# Patient Record
Sex: MALE | Race: WHITE
[De-identification: names, ages, dates, MRNs, and addresses within clinical notes are randomized per-mention and may not be internally consistent; named-entity substitution may affect disease eponyms.]

---

## 2018-04-04 ENCOUNTER — HOSPITAL ENCOUNTER (OUTPATIENT)
Dept: HOSPITAL 45 - C.LABBFT | Age: 23
Discharge: HOME | End: 2018-04-04
Attending: INTERNAL MEDICINE
Payer: COMMERCIAL

## 2018-04-04 DIAGNOSIS — E66.01: ICD-10-CM

## 2018-04-04 DIAGNOSIS — E03.9: Primary | ICD-10-CM

## 2018-04-05 LAB — HBA1C MFR BLD: 5.3 % (ref 4.5–5.6)

## 2018-04-25 ENCOUNTER — HOSPITAL ENCOUNTER (OUTPATIENT)
Dept: HOSPITAL 45 - C.ULTR | Age: 23
Discharge: HOME | End: 2018-04-25
Attending: INTERNAL MEDICINE
Payer: COMMERCIAL

## 2018-04-25 DIAGNOSIS — K31.84: ICD-10-CM

## 2018-04-25 DIAGNOSIS — R94.6: Primary | ICD-10-CM

## 2018-04-25 NOTE — DIAGNOSTIC IMAGING REPORT
GASTRIC EMPTYING



HISTORY: Upper abdominal pain GASTROPARESIS



COMPARISON: None.



TECHNIQUE: Following the oral administration of 1 mCi of technetium 99m sulfur

colloid in egg sandwich and 8 ounces of water, static abdominal images are

obtained anteriorly and posteriorly at 0 minutes, 1 hour, 2 hour, and 4 hour

time intervals. Gastric emptying was calculated utilizing the geometric mean

method.



FINDINGS: There is approximately 89 % activity remaining at the 1 hour time

interval (normal is less than 90%), 48 % remaining at the 2 hour time interval

(normal is less than 60%), and 20 % activity remaining at the 4 hour time

interval (normal is less than 10%).



IMPRESSION: Findings consistent with mild gastric emptying delay











The above report was generated using voice recognition software.  It may contain

grammatical, syntax or spelling errors.







Electronically signed by:  Leonard Quinn M.D.

4/25/2018 12:24 PM



Dictated Date/Time:  4/25/2018 12:23 PM

## 2018-04-25 NOTE — DIAGNOSTIC IMAGING REPORT
ULTRASOUND OF THE THYROID GLAND



CLINICAL HISTORY: Gastroparesis.



COMPARISON STUDY: No priors.



TECHNIQUE: Real-time, grayscale, and color flow sonography of the thyroid gland

is performed utilizing a high-frequency linear transducer. Images are reviewed

in the transverse and longitudinal planes.



FINDINGS:



Right lobe: The right lobe of the thyroid gland is normal in size and

heterogeneous in echotexture, measuring 5.9 x 2.2 x 2.1 cm. No discrete lesion

is identified. No hyperemia is seen on color imaging.



Left lobe: The left lobe of the thyroid gland is normal in size and

heterogeneous in echotexture, measuring 5.7 x 2.5 x 1.7 cm. No discrete lesion

is identified. No hyperemia is seen on color imaging.



Isthmus: The thyroid isthmus is heterogeneous and measures 0.3 cm in AP

diameter.





IMPRESSION:  



1. The thyroid gland is normal in size and heterogeneous in echotexture. The

appearance suggests the sequelae of thyroiditis. Correlation with serum thyroid

function studies will be required.



2. There is no glandular hyperemia on color imaging.



3. No discrete thyroid lesion is identified.







Electronically signed by:  Mati Rodriguez M.D.

4/25/2018 8:08 AM



Dictated Date/Time:  4/25/2018 8:07 AM

## 2018-08-07 ENCOUNTER — HOSPITAL ENCOUNTER (OUTPATIENT)
Dept: HOSPITAL 45 - C.LABBFT | Age: 23
Discharge: HOME | End: 2018-08-07
Attending: PHYSICIAN ASSISTANT
Payer: COMMERCIAL

## 2018-08-07 DIAGNOSIS — E03.9: Primary | ICD-10-CM
